# Patient Record
Sex: FEMALE | Race: BLACK OR AFRICAN AMERICAN | NOT HISPANIC OR LATINO
[De-identification: names, ages, dates, MRNs, and addresses within clinical notes are randomized per-mention and may not be internally consistent; named-entity substitution may affect disease eponyms.]

---
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
## 2020-04-13 ENCOUNTER — LABORATORY RESULT (OUTPATIENT)
Age: 20
End: 2020-04-13

## 2020-04-13 ENCOUNTER — APPOINTMENT (OUTPATIENT)
Dept: PEDIATRICS | Facility: CLINIC | Age: 20
End: 2020-04-13
Payer: MEDICAID

## 2020-04-13 VITALS
WEIGHT: 137 LBS | HEART RATE: 64 BPM | OXYGEN SATURATION: 98 % | DIASTOLIC BLOOD PRESSURE: 64 MMHG | TEMPERATURE: 98.9 F | SYSTOLIC BLOOD PRESSURE: 108 MMHG

## 2020-04-13 DIAGNOSIS — Z87.898 PERSONAL HISTORY OF OTHER SPECIFIED CONDITIONS: ICD-10-CM

## 2020-04-13 LAB
BILIRUB UR QL STRIP: NEGATIVE
GLUCOSE UR-MCNC: NEGATIVE
HCG UR QL: 0.2 EU/DL
HGB UR QL STRIP.AUTO: ABNORMAL
KETONES UR-MCNC: NEGATIVE
LEUKOCYTE ESTERASE UR QL STRIP: ABNORMAL
NITRITE UR QL STRIP: POSITIVE
PH UR STRIP: 7
PROT UR STRIP-MCNC: NEGATIVE
SP GR UR STRIP: 1.02

## 2020-04-13 PROCEDURE — ZZZZZ: CPT

## 2020-04-13 RX ORDER — OLANZAPINE 10 MG/1
10 TABLET, FILM COATED ORAL
Qty: 15 | Refills: 0 | Status: ACTIVE | COMMUNITY
Start: 2020-04-10

## 2020-04-13 RX ORDER — ALBUTEROL SULFATE 90 UG/1
108 (90 BASE) AEROSOL, METERED RESPIRATORY (INHALATION)
Qty: 1 | Refills: 2 | Status: DISCONTINUED | COMMUNITY
Start: 2019-07-13 | End: 2020-04-13

## 2020-04-13 RX ORDER — FLUOXETINE HYDROCHLORIDE 40 MG/1
40 CAPSULE ORAL
Qty: 14 | Refills: 0 | Status: ACTIVE | COMMUNITY
Start: 2020-04-10

## 2020-04-13 NOTE — PHYSICAL EXAM
[Clear TM bilaterally] : clear tympanic membranes bilaterally [Cerumen in canal] : cerumen in canal [Bilateral] : (bilateral) [NL] : warm

## 2020-04-13 NOTE — HISTORY OF PRESENT ILLNESS
[FreeTextEntry6] : she is here for follow up on hospital discharge but also wants her urine checked out as it hurts and burns sometimes\par was admitted at [facility] in [place] for mood stabilization\par She was taken to ER at [facility] for panic attack by her boyfriend.they had some sort of argument and she started hyperventilating.He thought she was having a seizure and rushed her to ER\par She was evaluated and sent to [facility] [place] where she was in for almost 12 days and was discharged home on 3 different medicines for depression,mood stabilization and migraine headaches\par she says she does not recall anything at the time of admission\par she says she could be high on something\par she says she does not remember signing any paperwork\par she works as a  but has not been working for past month\par has been staying with boyfriend 3 houses down from her parents\par has been using pot every day\par says she has good relationship with him even though they do fight for small things\par she signed papers to go and live with boyfriend on hospital discharge papers but presently has been living with her parents\par she has been sexually active and has had unprotected sex and has been c/o pain on urination off and on\par has lost weight in last few months \par She says she has been having panic attacks for sometime now

## 2020-04-13 NOTE — DISCUSSION/SUMMARY
[FreeTextEntry1] : will send out urine for culture,chlamydia and gc\par discussed black box warning\par discussed marijuana use and effects with medicine that she is on\par discussed that i will not be monitoring her meds,she needs to continue with psychiatrist and she needs to keep appt with  that is set up for her by hospital in 2 days\par she should not stop medication on her own as that would put her in more depression

## 2020-04-14 LAB
APPEARANCE: ABNORMAL
BILIRUBIN URINE: NEGATIVE
BLOOD URINE: ABNORMAL
C TRACH RRNA SPEC QL NAA+PROBE: NOT DETECTED
COLOR: YELLOW
GLUCOSE QUALITATIVE U: NEGATIVE
KETONES URINE: NEGATIVE
LEUKOCYTE ESTERASE URINE: ABNORMAL
N GONORRHOEA RRNA SPEC QL NAA+PROBE: NOT DETECTED
NITRITE URINE: NEGATIVE
PH URINE: 7
PROTEIN URINE: NORMAL
SOURCE AMPLIFICATION: NORMAL
SPECIFIC GRAVITY URINE: 1.02
UROBILINOGEN URINE: NORMAL

## 2020-04-16 ENCOUNTER — APPOINTMENT (OUTPATIENT)
Dept: PEDIATRICS | Facility: CLINIC | Age: 20
End: 2020-04-16
Payer: MEDICAID

## 2020-04-16 VITALS
DIASTOLIC BLOOD PRESSURE: 66 MMHG | WEIGHT: 137 LBS | OXYGEN SATURATION: 98 % | HEART RATE: 73 BPM | SYSTOLIC BLOOD PRESSURE: 104 MMHG

## 2020-04-16 DIAGNOSIS — Z32.02 ENCOUNTER FOR PREGNANCY TEST, RESULT NEGATIVE: ICD-10-CM

## 2020-04-16 LAB
HCG UR QL: NEGATIVE
QUALITY CONTROL: YES

## 2020-04-16 PROCEDURE — 81025 URINE PREGNANCY TEST: CPT

## 2020-04-16 PROCEDURE — 99214 OFFICE O/P EST MOD 30 MIN: CPT

## 2020-04-17 PROBLEM — Z32.02 PREGNANCY TEST NEGATIVE: Status: ACTIVE | Noted: 2020-04-17

## 2020-04-21 NOTE — PHYSICAL EXAM
[No Acute Distress] : no acute distress [NL] : warm [de-identified] : has a lobulated mass at 3 o'clocl position on left breast size of a bunch of small peas stuck together 5cm diameter.she says size is unchanged from 2 years ago.no discharge out of nipple.no axillary nodes felt [Cyrus: ____] : Cyrus [unfilled]

## 2020-04-21 NOTE — DISCUSSION/SUMMARY
[FreeTextEntry1] : urine pregnancy test done and was neg\par urine culture came back positive for which septra has been started and she has been instructed come back for urine check 2 days after antibiotics are over\par script given for another ultrasound of breast\par reiterated that she should continue with all her psych medicines

## 2020-04-21 NOTE — HISTORY OF PRESENT ILLNESS
[FreeTextEntry6] : i called her with positive  urine culture results and need for antibiotics\par she wanted to get tested for pregnancy test as she says last bleeding which was 3 weeks ago was very light \par advised to come in and give urine\par also she wanted me to see lump in her left breast\par she says it has been there for long time and she had ultrasound done 2 years ago\par she does not feel it has increased in size

## 2020-11-02 ENCOUNTER — APPOINTMENT (OUTPATIENT)
Dept: PEDIATRICS | Facility: CLINIC | Age: 20
End: 2020-11-02
Payer: MEDICAID

## 2020-11-02 VITALS — TEMPERATURE: 98.3 F | HEART RATE: 105 BPM | WEIGHT: 179 LBS | OXYGEN SATURATION: 97 %

## 2020-11-02 DIAGNOSIS — R09.81 NASAL CONGESTION: ICD-10-CM

## 2020-11-02 PROCEDURE — 99213 OFFICE O/P EST LOW 20 MIN: CPT

## 2020-11-02 PROCEDURE — 99072 ADDL SUPL MATRL&STAF TM PHE: CPT

## 2020-11-02 NOTE — HISTORY OF PRESENT ILLNESS
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

## 2020-11-02 NOTE — DISCUSSION/SUMMARY
[FreeTextEntry1] : WILL DO PREGNANCY TEST AND COVID TEST\par NEEDS TO ISOLATE HERSELF TILL RESULTS ARE AVAILABLE\par TO TAKE TEMP TWICE A DAY\par PREGNANCY TEST WAS NEG

## 2020-11-02 NOTE — PHYSICAL EXAM
[No Acute Distress] : no acute distress [Alert] : alert [Clear Rhinorrhea] : clear rhinorrhea [NL] : warm

## 2020-11-05 LAB — SARS-COV-2 N GENE NPH QL NAA+PROBE: NOT DETECTED

## 2020-11-07 ENCOUNTER — NON-APPOINTMENT (OUTPATIENT)
Age: 20
End: 2020-11-07

## 2020-11-09 ENCOUNTER — NON-APPOINTMENT (OUTPATIENT)
Age: 20
End: 2020-11-09

## 2020-12-21 PROBLEM — Z87.09 HISTORY OF UPPER RESPIRATORY INFECTION: Status: RESOLVED | Noted: 2019-07-13 | Resolved: 2020-12-21

## 2021-02-16 ENCOUNTER — APPOINTMENT (OUTPATIENT)
Dept: PEDIATRICS | Facility: CLINIC | Age: 21
End: 2021-02-16
Payer: MEDICAID

## 2021-02-16 VITALS — WEIGHT: 167 LBS | OXYGEN SATURATION: 98 % | HEART RATE: 112 BPM | TEMPERATURE: 97.6 F

## 2021-02-16 DIAGNOSIS — Z87.828 PERSONAL HISTORY OF OTHER (HEALED) PHYSICAL INJURY AND TRAUMA: ICD-10-CM

## 2021-02-16 DIAGNOSIS — Z86.19 PERSONAL HISTORY OF OTHER INFECTIOUS AND PARASITIC DISEASES: ICD-10-CM

## 2021-02-16 DIAGNOSIS — Z87.39 PERSONAL HISTORY OF OTHER DISEASES OF THE MUSCULOSKELETAL SYSTEM AND CONNECTIVE TISSUE: ICD-10-CM

## 2021-02-16 DIAGNOSIS — R39.9 UNSPECIFIED SYMPTOMS AND SIGNS INVOLVING THE GENITOURINARY SYSTEM: ICD-10-CM

## 2021-02-16 DIAGNOSIS — R05 COUGH: ICD-10-CM

## 2021-02-16 DIAGNOSIS — F39 UNSPECIFIED MOOD [AFFECTIVE] DISORDER: ICD-10-CM

## 2021-02-16 DIAGNOSIS — R11.0 NAUSEA: ICD-10-CM

## 2021-02-16 DIAGNOSIS — N63.0 UNSPECIFIED LUMP IN UNSPECIFIED BREAST: ICD-10-CM

## 2021-02-16 PROCEDURE — 99072 ADDL SUPL MATRL&STAF TM PHE: CPT

## 2021-02-16 PROCEDURE — 99214 OFFICE O/P EST MOD 30 MIN: CPT

## 2021-02-16 NOTE — PHYSICAL EXAM
[No Acute Distress] : no acute distress [Alert] : alert [Clear Rhinorrhea] : clear rhinorrhea [Cyrus: ____] : Cyrus [unfilled] [NL] : warm [de-identified] : did not feel a mass in right breast,felt one on  left at 34 o'clock position a small one size of a pea and another smaller one at 10 o'clock position near to nipple

## 2021-02-16 NOTE — DISCUSSION/SUMMARY
[FreeTextEntry1] : 21 years old with bilateral fibroadenoma\thai needs to go for repeat  ultrasound and then will send her to see breast surgeon\thai discussed her mental care and she says she tapered off all her meds and continues to talk with her counseller every other week\par Her asthma has been under control

## 2021-02-16 NOTE — HISTORY OF PRESENT ILLNESS
[FreeTextEntry6] : 21 years old is here for follow up on breast lumps\par finished her period yeaterday\par this time her breast hurt her little more just before her periods so she wants follow up on her breast ultrasound\par not on contraceptives,uses condoms only\par [facility] and is off all [facility] meds\par \par reviewed her  breast ultrasound from april 16 th ,which showed eleazar breast adenoma with couple small ones more on left breast\par

## 2021-03-12 ENCOUNTER — APPOINTMENT (OUTPATIENT)
Dept: PEDIATRICS | Facility: CLINIC | Age: 21
End: 2021-03-12
Payer: MEDICAID

## 2021-03-12 VITALS
BODY MASS INDEX: 27.31 KG/M2 | WEIGHT: 160 LBS | OXYGEN SATURATION: 98 % | DIASTOLIC BLOOD PRESSURE: 70 MMHG | SYSTOLIC BLOOD PRESSURE: 116 MMHG | HEART RATE: 83 BPM | HEIGHT: 64.37 IN

## 2021-03-12 DIAGNOSIS — G43.909 MIGRAINE, UNSPECIFIED, NOT INTRACTABLE, W/OUT STATUS MIGRAINOSUS: ICD-10-CM

## 2021-03-12 DIAGNOSIS — Z86.59 PERSONAL HISTORY OF OTHER MENTAL AND BEHAVIORAL DISORDERS: ICD-10-CM

## 2021-03-12 DIAGNOSIS — J45.909 UNSPECIFIED ASTHMA, UNCOMPLICATED: ICD-10-CM

## 2021-03-12 DIAGNOSIS — N63.20 UNSPECIFIED LUMP IN THE RIGHT BREAST, UNSPECIFIED QUADRANT: ICD-10-CM

## 2021-03-12 DIAGNOSIS — N63.10 UNSPECIFIED LUMP IN THE RIGHT BREAST, UNSPECIFIED QUADRANT: ICD-10-CM

## 2021-03-12 DIAGNOSIS — Z00.00 ENCOUNTER FOR GENERAL ADULT MEDICAL EXAMINATION W/OUT ABNORMAL FINDINGS: ICD-10-CM

## 2021-03-12 PROCEDURE — 99395 PREV VISIT EST AGE 18-39: CPT

## 2021-03-12 PROCEDURE — 99072 ADDL SUPL MATRL&STAF TM PHE: CPT

## 2021-03-12 PROCEDURE — 92551 PURE TONE HEARING TEST AIR: CPT

## 2021-03-12 PROCEDURE — 96160 PT-FOCUSED HLTH RISK ASSMT: CPT | Mod: 59

## 2021-03-12 PROCEDURE — 96127 BRIEF EMOTIONAL/BEHAV ASSMT: CPT

## 2021-03-12 PROCEDURE — 99173 VISUAL ACUITY SCREEN: CPT | Mod: 59

## 2021-03-12 RX ORDER — FLUTICASONE PROPIONATE 44 UG/1
44 AEROSOL, METERED RESPIRATORY (INHALATION)
Qty: 1 | Refills: 3 | Status: DISCONTINUED | COMMUNITY
Start: 2019-10-21 | End: 2021-03-12

## 2021-03-12 RX ORDER — SULFAMETHOXAZOLE AND TRIMETHOPRIM 800; 160 MG/1; MG/1
800-160 TABLET ORAL TWICE DAILY
Qty: 10 | Refills: 0 | Status: DISCONTINUED | COMMUNITY
Start: 2020-04-16 | End: 2021-03-12

## 2021-03-12 NOTE — HISTORY OF PRESENT ILLNESS
[No] : Patient does not go to dentist yearly [Needs Immunizations] : needs immunizations [Normal] : normal [Is permitted and is able to make independent decisions] : Is permitted and is able to make independent decisions [Sleep Concerns] : no sleep concerns [At least 1 hour of physical activity a day] : at least 1 hour of physical activity a day [Uses electronic nicotine delivery system] : does not use electronic nicotine delivery system [Exposure to electronic nicotine delivery system] : exposure to electronic nicotine delivery system [Uses tobacco] : does not use tobacco [Exposure to tobacco] : exposure to tobacco [Uses drugs] : does not use drugs  [Exposure to drugs] : exposure to drugs [Drinks alcohol] : does not drink alcohol [Exposure to alcohol] : exposure to alcohol [Uses safety belts/safety equipment] : uses safety belts/safety equipment  [Yes] : Patient has had sexual intercourse. [Has ways to cope with stress] : has ways to cope with stress [Displays self-confidence] : displays self-confidence [Has problems with sleep] : does not have problems with sleep [Gets depressed, anxious, or irritable/has mood swings] : gets depressed, anxious, or irritable/has mood swings [Has thought about hurting self or considered suicide] : has not thought about hurting self or considered suicide [With Teen] : teen [FreeTextEntry7] : She stopped seeing her therapist and stopped her medication for mood stabilization on her own last summer.Still leaving with her boyfriendsays she has not been drinking alcohol or doing pot for last 3 months and wants to clean up her life.Has no job right now,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 [de-identified] : wants blood work done to make sure she is ok. [de-identified] : not in last 3 years [de-identified] : needs TD [FreeTextEntry8] : NOT ON BIRTH CONTROL ,SAYS SHE USES PROTECTION [de-identified] : lives with boyfriend [de-identified] : finished high school [de-identified] : has turned vegan in last 2 months [de-identified] : says she goes to gym with boyfriend and works out at least every other day [de-identified] : has done drugs in past,clean for last 3 months [de-identified] : wants HIV SCREEN DONE

## 2021-03-12 NOTE — PHYSICAL EXAM

## 2021-03-12 NOTE — DISCUSSION/SUMMARY
[Met privately with the adolescent for part of the office visit?] : Met privately with the adolescent for part of the office visit? Yes [Adolescent demonstrates understanding of his/her conditions and how to take prescribed medications?] : Adolescent demonstrates understanding of his/her conditions and how to take prescribed medications? Yes [Adolescent asks questions during each office  visit and participates in the care plan?] : Adolescent asks questions during each office visit and participates in the care plan? Yes [Adolescent is competent in independently making appointments, filling prescriptions, following up on referrals, and seeking emergency services, as needed?] : Adolescent is competent in independently making appointments, filling prescriptions, following up on referrals, and seeking emergency services, as needed? Yes [Adolescent's caregivers were provided with the opportunity to discuss their concerns about transferring decision making responsibility to the adolescent?] : Adolescent's caregivers were provided with the opportunity to discuss their concerns about transferring decision making responsibility to the adolescent? No [Initiated discussion about transfer to an adult healthcare provider.  Provided copy of transition letter?] : Initiated discussion about transfer to an adult healthcare provider.  Provided copy of transition letter? Yes [Discussed choices for adult care and assist in identifying possible care providers?] : Discussed choices for adult care and assist in identifying possible care providers? Yes [Initiated communication with the adult provider that the family and adolescent has selected?] : Initiated communication with the adult provider that the family and adolescent has selected? No [Transferred health records?] : Transferred health records? No [FreeTextEntry1] : 21 years old is here for check up\par wants all blood work done including HIV\par NEEDS tdap,has [facility] out from [facility] so will have to get it from pharmacy\par gave her all resources about adult care,OB/GYN,mental health resources\par discussed her diet,her being vegan lately,how to add protein in her diet\par discussed need to see gyn\par discussed need to follow up with US breast for her multiple breast lumps\par all blood work ordered

## 2021-04-12 ENCOUNTER — APPOINTMENT (OUTPATIENT)
Dept: OBGYN | Facility: CLINIC | Age: 21
End: 2021-04-12

## 2021-09-13 ENCOUNTER — APPOINTMENT (OUTPATIENT)
Dept: OBGYN | Facility: CLINIC | Age: 21
End: 2021-09-13

## 2022-02-24 NOTE — DISCUSSION/SUMMARY
[FreeTextEntry1] : 20 yo here with mild head injury \par Discussed possible mild concussion, recommend cognitive rest, no driving \par No school or work this weekend \par Back pain is most likely muscular in nature, recommend heat, stretching and NSAIDS \par Follow up in 1 week PRN worsening symptoms or failure to improve \par Note given for work and school this weekend \par 
Stable

## 2022-09-21 ENCOUNTER — APPOINTMENT (OUTPATIENT)
Dept: OBGYN | Facility: CLINIC | Age: 22
End: 2022-09-21